# Patient Record
Sex: FEMALE | Race: OTHER
[De-identification: names, ages, dates, MRNs, and addresses within clinical notes are randomized per-mention and may not be internally consistent; named-entity substitution may affect disease eponyms.]

---

## 2020-11-27 ENCOUNTER — HOSPITAL ENCOUNTER (INPATIENT)
Dept: HOSPITAL 93 - ER | Age: 80
LOS: 16 days | DRG: 853 | End: 2020-12-13
Attending: SPECIALIST | Admitting: SPECIALIST
Payer: COMMERCIAL

## 2020-11-27 VITALS — BODY MASS INDEX: 21.6 KG/M2 | HEIGHT: 60 IN | WEIGHT: 110 LBS

## 2020-11-27 DIAGNOSIS — N18.9: ICD-10-CM

## 2020-11-27 DIAGNOSIS — F02.80: ICD-10-CM

## 2020-11-27 DIAGNOSIS — E86.0: ICD-10-CM

## 2020-11-27 DIAGNOSIS — D64.9: ICD-10-CM

## 2020-11-27 DIAGNOSIS — Z66: ICD-10-CM

## 2020-11-27 DIAGNOSIS — E87.0: ICD-10-CM

## 2020-11-27 DIAGNOSIS — R65.21: ICD-10-CM

## 2020-11-27 DIAGNOSIS — L89.154: ICD-10-CM

## 2020-11-27 DIAGNOSIS — Z79.4: ICD-10-CM

## 2020-11-27 DIAGNOSIS — Z20.828: ICD-10-CM

## 2020-11-27 DIAGNOSIS — E11.22: ICD-10-CM

## 2020-11-27 DIAGNOSIS — A41.59: Primary | ICD-10-CM

## 2020-11-27 DIAGNOSIS — N17.8: ICD-10-CM

## 2020-11-27 DIAGNOSIS — G30.8: ICD-10-CM

## 2020-11-27 DIAGNOSIS — L97.419: ICD-10-CM

## 2020-11-27 DIAGNOSIS — M46.28: ICD-10-CM

## 2020-11-27 DIAGNOSIS — I10: ICD-10-CM

## 2020-11-27 DIAGNOSIS — B95.4: ICD-10-CM

## 2020-11-27 DIAGNOSIS — Z74.01: ICD-10-CM

## 2020-11-27 DIAGNOSIS — E87.2: ICD-10-CM

## 2020-11-27 DIAGNOSIS — J69.0: ICD-10-CM

## 2020-11-27 NOTE — NUR
SE RECIBE PTE E AMBULANCIA LETARGICA TIENE ALZHEIMER ACOMPNADA POR TORO HIJO EL
CUAL REFIERE QUE LA PTE TIENE ULCERA SACRAL LLEVA 2 SEMANAS DE TRATAMIENTO EN
EL AREA SACRAL,REFIERE QUE TIENE BLISTER EN EL JODY DEL PIE RT.

## 2020-11-27 NOTE — NUR
PT ALERTA Y ORIENTADA X3 ESFERAS SE LE ORIENTA SOBRE TX Y REFIERE ENTEDER. SE
ERIKA MUESTRAS DE BERNY Y VENOPUNCION CON TECNICAS ASEPTICAS. SE ADMINSITRAN
MEDICAMENTOS E IVFLUIDS ORDENADOS. PT TOLERA TX.
SE REALIZA INSERCION DE HERNANDEZ CATETER CON TECNICAS ASEPTICAS Y ESTERILES. 
SE REALIZA CAMBIO DE PAMPER SUCIO.
SE REALIZA CAMBIO DE POSICION.
SE KIRILL PT BAJO OBSERVACION POR CAMBIOS EN MATTI. PT TOLERA TX.

## 2020-11-29 PROCEDURE — B24BZZZ ULTRASONOGRAPHY OF HEART WITH AORTA: ICD-10-PCS | Performed by: INTERNAL MEDICINE

## 2020-12-04 PROCEDURE — 0QB13ZX EXCISION OF SACRUM, PERCUTANEOUS APPROACH, DIAGNOSTIC: ICD-10-PCS | Performed by: SURGERY

## 2020-12-04 PROCEDURE — 0JD73ZZ EXTRACTION OF BACK SUBCUTANEOUS TISSUE AND FASCIA, PERCUTANEOUS APPROACH: ICD-10-PCS | Performed by: SURGERY

## 2020-12-05 PROCEDURE — 30233N1 TRANSFUSION OF NONAUTOLOGOUS RED BLOOD CELLS INTO PERIPHERAL VEIN, PERCUTANEOUS APPROACH: ICD-10-PCS | Performed by: SPECIALIST

## 2020-12-09 PROCEDURE — 3E0F7SF INTRODUCTION OF OTHER GAS INTO RESPIRATORY TRACT, VIA NATURAL OR ARTIFICIAL OPENING: ICD-10-PCS | Performed by: SPECIALIST

## 2020-12-09 PROCEDURE — 0JD73ZZ EXTRACTION OF BACK SUBCUTANEOUS TISSUE AND FASCIA, PERCUTANEOUS APPROACH: ICD-10-PCS | Performed by: SURGERY

## 2020-12-10 PROCEDURE — 0DH67UZ INSERTION OF FEEDING DEVICE INTO STOMACH, VIA NATURAL OR ARTIFICIAL OPENING: ICD-10-PCS | Performed by: SPECIALIST

## 2020-12-10 PROCEDURE — 4A12X4Z MONITORING OF CARDIAC ELECTRICAL ACTIVITY, EXTERNAL APPROACH: ICD-10-PCS | Performed by: SPECIALIST
